# Patient Record
Sex: FEMALE | Race: WHITE | NOT HISPANIC OR LATINO | Employment: OTHER | URBAN - METROPOLITAN AREA
[De-identification: names, ages, dates, MRNs, and addresses within clinical notes are randomized per-mention and may not be internally consistent; named-entity substitution may affect disease eponyms.]

---

## 2023-09-21 PROBLEM — M47.819 SPONDYLOSIS WITHOUT MYELOPATHY: Status: ACTIVE | Noted: 2023-09-21

## 2023-09-21 PROBLEM — S02.2XXA CLOSED FRACTURE OF NASAL BONES: Status: ACTIVE | Noted: 2023-09-21

## 2023-09-21 PROBLEM — R73.01 IFG (IMPAIRED FASTING GLUCOSE): Status: ACTIVE | Noted: 2019-10-31

## 2023-09-21 PROBLEM — T14.8XXA HEMATOMA: Status: ACTIVE | Noted: 2023-09-21

## 2023-09-21 PROBLEM — I70.269: Status: ACTIVE | Noted: 2023-09-21

## 2023-09-21 PROBLEM — M81.0 OSTEOPOROSIS: Status: ACTIVE | Noted: 2018-10-29

## 2023-09-21 PROBLEM — E55.9 VITAMIN D DEFICIENCY: Status: ACTIVE | Noted: 2023-09-21

## 2023-09-21 PROBLEM — S82.001A CLOSED NONDISPLACED FRACTURE OF RIGHT PATELLA: Status: ACTIVE | Noted: 2023-09-21

## 2023-09-21 PROBLEM — R09.89 CAROTID BRUIT: Status: ACTIVE | Noted: 2018-10-29

## 2023-09-21 PROBLEM — I65.29 CAROTID STENOSIS, ASYMPTOMATIC: Status: ACTIVE | Noted: 2023-09-21

## 2023-09-21 PROBLEM — I10 PRIMARY HYPERTENSION: Status: ACTIVE | Noted: 2018-10-09

## 2023-09-21 PROBLEM — I75.022: Status: ACTIVE | Noted: 2023-09-21

## 2023-09-21 PROBLEM — I20.9 ANGINA PECTORIS (CMS-HCC): Status: ACTIVE | Noted: 2023-09-21

## 2023-09-21 PROBLEM — I25.10 CORONARY ARTERIOSCLEROSIS: Status: ACTIVE | Noted: 2023-09-21

## 2023-09-21 PROBLEM — F32.89 OTHER SPECIFIED DEPRESSIVE EPISODES: Status: ACTIVE | Noted: 2023-09-21

## 2023-09-21 PROBLEM — I63.9 CEREBROVASCULAR ACCIDENT (MULTI): Status: ACTIVE | Noted: 2023-09-21

## 2023-09-21 PROBLEM — I48.91 ATRIAL FIBRILLATION (MULTI): Status: ACTIVE | Noted: 2023-09-21

## 2023-09-21 PROBLEM — F32.A DEPRESSION: Status: ACTIVE | Noted: 2023-09-21
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
## 2023-09-21 PROBLEM — D47.3 ESSENTIAL THROMBOCYTOSIS (MULTI): Status: ACTIVE | Noted: 2023-09-21

## 2023-09-21 PROBLEM — F41.1 GENERALIZED ANXIETY DISORDER: Status: ACTIVE | Noted: 2019-05-05

## 2023-09-21 PROBLEM — I70.229: Status: ACTIVE | Noted: 2023-09-21

## 2023-09-21 PROBLEM — M26.609 TEMPOROMANDIBULAR JOINT DISORDER: Status: ACTIVE | Noted: 2019-09-19

## 2023-09-21 PROBLEM — S82.009A FRACTURE OF PATELLA: Status: ACTIVE | Noted: 2023-09-21

## 2023-09-21 PROBLEM — E78.5 DYSLIPIDEMIA: Status: ACTIVE | Noted: 2018-10-09

## 2023-09-21 PROBLEM — H92.09 REFERRED OTALGIA: Status: ACTIVE | Noted: 2019-09-19

## 2023-09-21 PROBLEM — G47.33 OBSTRUCTIVE SLEEP APNEA SYNDROME: Status: ACTIVE | Noted: 2023-09-21

## 2023-09-21 PROBLEM — I70.1 RENAL ARTERY STENOSIS (CMS-HCC): Status: ACTIVE | Noted: 2023-09-21

## 2023-09-21 PROBLEM — E78.2 COMBINED HYPERLIPIDEMIA: Status: ACTIVE | Noted: 2023-09-21

## 2023-09-21 PROBLEM — E78.2 MIXED HYPERLIPIDEMIA: Status: ACTIVE | Noted: 2019-03-22

## 2023-09-21 PROBLEM — I50.9 HEART FAILURE (MULTI): Status: ACTIVE | Noted: 2023-09-21

## 2023-09-21 PROBLEM — D64.9 ANEMIA: Status: ACTIVE | Noted: 2023-09-21

## 2023-09-21 PROBLEM — I73.9 PAD (PERIPHERAL ARTERY DISEASE) (CMS-HCC): Status: ACTIVE | Noted: 2023-09-21

## 2023-09-21 PROBLEM — M19.90 OSTEOARTHRITIS: Status: ACTIVE | Noted: 2023-09-21

## 2023-09-21 PROBLEM — K21.9 GASTRO-ESOPHAGEAL REFLUX DISEASE WITHOUT ESOPHAGITIS: Status: ACTIVE | Noted: 2018-10-29

## 2023-09-21 PROBLEM — I25.10 CAD (CORONARY ARTERY DISEASE): Status: ACTIVE | Noted: 2019-03-22

## 2023-09-21 PROBLEM — I73.9 LEFT LEG CLAUDICATION (CMS-HCC): Status: ACTIVE | Noted: 2018-10-29

## 2023-09-21 PROBLEM — M81.0 POST-MENOPAUSAL OSTEOPOROSIS: Status: ACTIVE | Noted: 2023-09-21

## 2023-09-21 PROBLEM — Z86.39 HISTORY OF HYPERLIPIDEMIA: Status: ACTIVE | Noted: 2023-09-21

## 2023-09-21 PROBLEM — I70.8 AORTO-ILIAC ATHEROSCLEROSIS (CMS-HCC): Status: ACTIVE | Noted: 2023-09-21

## 2023-09-21 PROBLEM — I73.9 PERIPHERAL VASCULAR DISEASE (CMS-HCC): Status: ACTIVE | Noted: 2023-09-21

## 2023-09-21 PROBLEM — D75.89 MACROCYTOSIS: Status: ACTIVE | Noted: 2023-09-21

## 2023-09-21 PROBLEM — R13.10 DYSPHAGIA: Status: ACTIVE | Noted: 2023-09-21

## 2023-09-21 PROBLEM — H53.2 DIPLOPIA: Status: ACTIVE | Noted: 2023-09-21

## 2023-09-21 PROBLEM — I70.0 AORTO-ILIAC ATHEROSCLEROSIS (CMS-HCC): Status: ACTIVE | Noted: 2023-09-21

## 2023-09-21 PROBLEM — R59.0 CERVICAL LYMPHADENOPATHY: Status: ACTIVE | Noted: 2019-09-19

## 2023-09-21 PROBLEM — R07.89 ATYPICAL CHEST PAIN: Status: ACTIVE | Noted: 2023-09-21

## 2023-09-21 PROBLEM — R55 PRE-SYNCOPE: Status: ACTIVE | Noted: 2023-09-21

## 2023-09-21 PROBLEM — K29.60 REFLUX GASTRITIS: Status: ACTIVE | Noted: 2023-09-21

## 2023-09-21 PROBLEM — R41.3 MEMORY IMPAIRMENT: Status: ACTIVE | Noted: 2023-09-21

## 2023-09-21 PROBLEM — R00.2 HEART PALPITATIONS: Status: ACTIVE | Noted: 2023-09-21

## 2023-09-21 PROBLEM — E27.8 ADRENAL NODULE (MULTI): Status: ACTIVE | Noted: 2023-09-21

## 2023-09-21 PROBLEM — I73.00 RAYNAUD'S DISEASE: Status: ACTIVE | Noted: 2018-10-29

## 2023-09-21 PROBLEM — L03.119 CELLULITIS OF FOOT: Status: ACTIVE | Noted: 2023-09-21

## 2023-09-21 PROBLEM — I73.00 RAYNAUD'S PHENOMENON: Status: ACTIVE | Noted: 2019-03-22

## 2023-09-21 PROBLEM — D13.7 BENIGN INSULINOMA: Status: ACTIVE | Noted: 2023-09-21

## 2023-09-21 RX ORDER — RIVAROXABAN 2.5 MG/1
1 TABLET, FILM COATED ORAL 2 TIMES DAILY
COMMUNITY
Start: 2019-06-24

## 2023-09-21 RX ORDER — HYDROXYUREA 500 MG/1
CAPSULE ORAL
COMMUNITY
Start: 2013-04-15

## 2023-09-21 RX ORDER — METOPROLOL SUCCINATE 50 MG/1
1 TABLET, EXTENDED RELEASE ORAL DAILY
COMMUNITY
Start: 2023-07-31

## 2023-09-21 RX ORDER — ALBUTEROL SULFATE 90 UG/1
AEROSOL, METERED RESPIRATORY (INHALATION)
COMMUNITY
Start: 2019-01-14

## 2023-09-21 RX ORDER — AMLODIPINE BESYLATE 5 MG/1
TABLET ORAL
COMMUNITY
Start: 2019-12-20

## 2023-09-21 RX ORDER — AZITHROMYCIN 500 MG/1
TABLET, FILM COATED ORAL
COMMUNITY

## 2023-09-21 RX ORDER — ESOMEPRAZOLE MAGNESIUM 40 MG/1
1 CAPSULE, DELAYED RELEASE ORAL DAILY
COMMUNITY
Start: 2020-01-15

## 2023-09-21 RX ORDER — ASPIRIN 81 MG/1
1 TABLET ORAL DAILY
COMMUNITY
Start: 2018-06-01

## 2023-09-21 RX ORDER — BUPROPION HYDROCHLORIDE 300 MG/1
1 TABLET ORAL DAILY
COMMUNITY
Start: 2023-07-31

## 2023-09-21 RX ORDER — PANTOPRAZOLE SODIUM 40 MG/1
TABLET, DELAYED RELEASE ORAL
COMMUNITY

## 2023-09-21 RX ORDER — DULOXETIN HYDROCHLORIDE 30 MG/1
1 CAPSULE, DELAYED RELEASE ORAL 2 TIMES DAILY
COMMUNITY
Start: 2023-07-31

## 2023-09-21 RX ORDER — VERAPAMIL HYDROCHLORIDE 40 MG/1
TABLET ORAL
COMMUNITY

## 2023-09-21 RX ORDER — ATORVASTATIN CALCIUM 40 MG/1
TABLET, FILM COATED ORAL
COMMUNITY
Start: 2020-01-15 | End: 2023-11-13 | Stop reason: DRUGHIGH

## 2023-09-21 RX ORDER — CLOPIDOGREL BISULFATE 75 MG/1
TABLET ORAL
COMMUNITY
Start: 2021-12-15

## 2023-09-21 RX ORDER — DEXAMETHASONE 1 MG/1
1 TABLET ORAL
COMMUNITY
Start: 2023-08-15

## 2023-09-21 RX ORDER — ZOLEDRONIC ACID 5 MG/100ML
INJECTION, SOLUTION INTRAVENOUS
COMMUNITY
Start: 2021-03-01

## 2023-09-21 RX ORDER — ERGOCALCIFEROL 1.25 MG/1
1 CAPSULE ORAL
COMMUNITY
Start: 2019-12-30

## 2023-09-21 RX ORDER — ACETAMINOPHEN 500 MG
1 TABLET ORAL DAILY
COMMUNITY
Start: 2019-12-06

## 2023-09-21 RX ORDER — OMEPRAZOLE 40 MG/1
CAPSULE, DELAYED RELEASE ORAL
COMMUNITY

## 2023-09-21 RX ORDER — ATORVASTATIN CALCIUM 80 MG/1
1 TABLET, FILM COATED ORAL NIGHTLY
COMMUNITY
Start: 2015-06-08 | End: 2023-11-13 | Stop reason: SDUPTHER

## 2023-10-20 ENCOUNTER — OFFICE VISIT (OUTPATIENT)
Dept: RHEUMATOLOGY | Facility: CLINIC | Age: 73
End: 2023-10-20
Payer: MEDICARE

## 2023-10-20 VITALS — SYSTOLIC BLOOD PRESSURE: 116 MMHG | DIASTOLIC BLOOD PRESSURE: 70 MMHG | WEIGHT: 100.7 LBS | BODY MASS INDEX: 17.29 KG/M2

## 2023-10-20 DIAGNOSIS — M70.61 TROCHANTERIC BURSITIS OF RIGHT HIP: ICD-10-CM

## 2023-10-20 DIAGNOSIS — M81.0 OSTEOPOROSIS, UNSPECIFIED OSTEOPOROSIS TYPE, UNSPECIFIED PATHOLOGICAL FRACTURE PRESENCE: Primary | ICD-10-CM

## 2023-10-20 PROCEDURE — 3074F SYST BP LT 130 MM HG: CPT | Performed by: INTERNAL MEDICINE

## 2023-10-20 PROCEDURE — 1126F AMNT PAIN NOTED NONE PRSNT: CPT | Performed by: INTERNAL MEDICINE

## 2023-10-20 PROCEDURE — 99214 OFFICE O/P EST MOD 30 MIN: CPT | Mod: 25 | Performed by: INTERNAL MEDICINE

## 2023-10-20 PROCEDURE — 3078F DIAST BP <80 MM HG: CPT | Performed by: INTERNAL MEDICINE

## 2023-10-20 PROCEDURE — 2500000004 HC RX 250 GENERAL PHARMACY W/ HCPCS (ALT 636 FOR OP/ED): Performed by: INTERNAL MEDICINE

## 2023-10-20 PROCEDURE — 99214 OFFICE O/P EST MOD 30 MIN: CPT | Performed by: INTERNAL MEDICINE

## 2023-10-20 RX ORDER — TRIAMCINOLONE ACETONIDE 40 MG/ML
40 INJECTION, SUSPENSION INTRA-ARTICULAR; INTRAMUSCULAR
Status: COMPLETED | OUTPATIENT
Start: 2023-10-20 | End: 2023-10-20

## 2023-10-20 RX ORDER — ALBUTEROL SULFATE 0.83 MG/ML
3 SOLUTION RESPIRATORY (INHALATION) AS NEEDED
Status: CANCELLED | OUTPATIENT
Start: 2023-10-23

## 2023-10-20 RX ORDER — EPINEPHRINE 0.3 MG/.3ML
0.3 INJECTION SUBCUTANEOUS EVERY 5 MIN PRN
Status: CANCELLED | OUTPATIENT
Start: 2023-10-23

## 2023-10-20 RX ORDER — ZOLEDRONIC ACID 5 MG/100ML
5 INJECTION, SOLUTION INTRAVENOUS ONCE
Status: CANCELLED | OUTPATIENT
Start: 2023-10-23

## 2023-10-20 RX ORDER — DIPHENHYDRAMINE HYDROCHLORIDE 50 MG/ML
50 INJECTION INTRAMUSCULAR; INTRAVENOUS AS NEEDED
Status: CANCELLED | OUTPATIENT
Start: 2023-10-23

## 2023-10-20 RX ADMIN — TRIAMCINOLONE ACETONIDE 40 MG: 40 INJECTION, SUSPENSION INTRA-ARTICULAR; INTRAMUSCULAR at 14:40

## 2023-10-20 NOTE — PROGRESS NOTES
"Recheck  OA  /  OP   due for Reclast ( [place] )  C/O right hip pain radiating into right leg off and on x 1 year.    10 min late  Poor historian    HPI - \"I just changed all my doctors, and I'm going in circles\"  It appears that she last got reclast from primary around Jan, and it was her primary that ordered it and ordered her DEXA.  However, she said she can't even remember if she got the reclast or if so, where.  She says \"I stopped seeing him\" - although it was a female dr.  The order for the DEXA that I ordered is still pending, and I did not see the other one prior to today since I didn't order it, and the results were not sent to me.  I didn't order reclast last yr - the last order I placed was in 2021.  She c/o lat hip pain and into the groin, and \"down my leg\" but only upper thigh.  No other pain.  No numbness/tingling    PE  NAD  RRR no r/m/g  CTA  No edema  No synovitis  R troch tender - reproduces pain    A/P - OA, OP, troch b  R troch injection - expl risks/benefits.  Pt gave written consent.  Aseptic tech, injected with 40mg kenalog and 1ml 1% lido   My office was able to d/w Dr. Elam's office after the pt left - they didn't give her reclast, so she is 1 yr overdue.  Will order.    Reeval 1 yr  Patient ID: Mariela Randall is a 73 y.o. female.    Large Joint Injection/Arthrocentesis on 10/20/2023 2:40 PM  Indications: pain  Medications: 40 mg triamcinolone acetonide 40 mg/mL  Procedure, treatment alternatives, risks and benefits explained, specific risks discussed. Consent was given by the patient.         "

## 2023-11-13 DIAGNOSIS — E78.5 HYPERLIPIDEMIA: Primary | ICD-10-CM

## 2023-11-13 RX ORDER — ATORVASTATIN CALCIUM 80 MG/1
80 TABLET, FILM COATED ORAL NIGHTLY
Qty: 90 TABLET | Refills: 3 | Status: SHIPPED | OUTPATIENT
Start: 2023-11-13 | End: 2024-11-12

## 2023-12-08 ENCOUNTER — CLINICAL SUPPORT (OUTPATIENT)
Dept: VASCULAR MEDICINE | Facility: CLINIC | Age: 73
End: 2023-12-08
Payer: MEDICARE

## 2023-12-08 DIAGNOSIS — I73.9 PERIPHERAL VASCULAR DISEASE, UNSPECIFIED (CMS-HCC): ICD-10-CM

## 2023-12-08 PROCEDURE — 93923 UPR/LXTR ART STDY 3+ LVLS: CPT | Performed by: INTERNAL MEDICINE

## 2023-12-08 PROCEDURE — 93923 UPR/LXTR ART STDY 3+ LVLS: CPT

## 2024-01-18 ENCOUNTER — DOCUMENTATION (OUTPATIENT)
Dept: INFUSION THERAPY | Facility: CLINIC | Age: 74
End: 2024-01-18
Payer: MEDICARE

## 2024-10-14 ENCOUNTER — APPOINTMENT (OUTPATIENT)
Dept: RHEUMATOLOGY | Facility: CLINIC | Age: 74
End: 2024-10-14
Payer: MEDICARE